# Patient Record
Sex: FEMALE | Race: WHITE
[De-identification: names, ages, dates, MRNs, and addresses within clinical notes are randomized per-mention and may not be internally consistent; named-entity substitution may affect disease eponyms.]

---

## 2017-01-24 ENCOUNTER — HOSPITAL ENCOUNTER (OUTPATIENT)
Dept: HOSPITAL 62 - OD | Age: 52
End: 2017-01-24
Attending: OBSTETRICS & GYNECOLOGY
Payer: COMMERCIAL

## 2017-01-24 DIAGNOSIS — R06.2: Primary | ICD-10-CM

## 2017-01-24 PROCEDURE — 71020: CPT

## 2017-03-02 ENCOUNTER — HOSPITAL ENCOUNTER (OUTPATIENT)
Dept: HOSPITAL 62 - RAD | Age: 52
End: 2017-03-02
Payer: COMMERCIAL

## 2017-03-02 DIAGNOSIS — R07.9: Primary | ICD-10-CM

## 2017-03-02 PROCEDURE — 93017 CV STRESS TEST TRACING ONLY: CPT

## 2017-03-02 PROCEDURE — A9500 TC99M SESTAMIBI: HCPCS

## 2017-03-02 PROCEDURE — 78452 HT MUSCLE IMAGE SPECT MULT: CPT

## 2017-03-02 NOTE — DRAGON STRESS TEST REPORT
INTRAVENOUS LEXISCAN CARDIOLITE STRESS TEST USING SINGLE PHOTON EMMISION 
COMPUTERIZED TOMOGRAPHIC.



DATE OF PROCEDURE: March 2, 2017



INDICATION : Chest pain



CARDIAC RISK FACTORS: Hypertension



RESTING EKG: Sinus rhythm, no Baseline ST-T wave changes noted



STRESS EKG: No significant changes noted with LexiScan bolus 



REASON FOR TERMINATION: Protocol.





PROCEDURE REPORT: Baseline heart rate 69 beats per minute with blood pressure 
of 103/51.  Patient had no significant complaints.  Heart rate at 2 minutes 
post bolus 106 with a blood pressure of 123/62.  3 minutes post bolus heart 
rate 90 with blood pressure of 130/65.  No significant EKG changes were noted.  
Patient had no significant complaints during the procedure or postprocedure.



CONCLUSIONS: Normal EKG and hemodynamic response to IV LexiScan.







NUCLEAR DATA:

At rest the patient was given 10.57 millicuries of technetium 99 sestamibi 
injected intravenously.  As per protocol rest gated SPECT images were obtained.
  Subsequently the patient was given intravenous LexiScan at a dose of 0.4 mg 
in 5 mL intravenously, followed by flush with normal saline.  Subsequently the 
stress dose of 31.8 millicuries of technetium 99 sestamibi was injected 
intravenously.  As per protocol stress gated images were obtained.  



NUCLEAR INTERPRETATION: Both raw and processed data were used for 
interpretation.  Visual, qualitative, computer-generated quantitative data was 
used.  There was good myocardial uptake of technetium compound.  Motion 
artifact and soft tissue attenuations were noted.  Increased visceral uptake 
was noted.  No definitive areas of transient perfusion defect noted.  No 
definitive areas of fixed perfusion defect or scars noted.



EKG gated imaging showed LV EF at 66 %, rest and stress gated EF similar 
visually.  T. I D. computer-generated ratio was 1.45.  Lung heart ratio noted 
to be within normal limits 0.31.  No significant extracardiac and abnormal 
radiotracer activities were noted.  RV free wall uptake was noted to be 
borderline increased.



IMPRESSION: Also refer to comments under nuclear interpretation. Also test 
results needs to be interpreted in the context of pretest probability.





1.  There is no definitive scintigraphic evidence of LexiScan induced 
myocardial ischemia.

2.  There is no definitive scintigraphic evidence of myocardial infarction/scar.

3.  EKG gated imaging shows left ejection fraction of approximately 66 %. 

4.  Transient ischemic dilatation was noted.  This is of uncertain significance 
in the absence of significant perfusion abnormalities.  Most recent literature 
review does not indicate increased cardiovascular event risk with transient 
ischemic dilatation in the absence of significant perfusion abnormalities. 
However sometimes it could be related to LVH and occasionally could be related 
to balanced ischemia.  

5. Clinical correlation requested as occasionally worse disease or balanced 
ischemia could be missed. In approximately 10% of the cases Lexiscan may not 
cause adequate vasodilatory stress.



RECOMMENDATIONS:

Aggressive risk factor modification, medical therapy.

Clinical correlation with echocardiogram derived ejection fraction.

Inability to exercise by itself can lead to increased cardiovascular event 
risks.

Consider cardiology consultation if clinically indicated.











Alexis Polk M.D., ITZEL

Consultant cardiologist,

Board certified in cardiovascular diseases, 

Nuclear cardiology, 

Echocardiography

Cardiac CT and cardiac MRI

Ph. [phone] 

Fax [phone]
[facility]

## 2018-05-14 ENCOUNTER — HOSPITAL ENCOUNTER (OUTPATIENT)
Dept: HOSPITAL 62 - RAD | Age: 53
End: 2018-05-14
Attending: INTERNAL MEDICINE
Payer: COMMERCIAL

## 2018-05-14 DIAGNOSIS — R14.0: Primary | ICD-10-CM

## 2018-05-14 DIAGNOSIS — R10.9: ICD-10-CM

## 2018-05-14 DIAGNOSIS — R10.2: ICD-10-CM

## 2018-05-14 PROCEDURE — 74176 CT ABD & PELVIS W/O CONTRAST: CPT

## 2018-05-15 NOTE — RADIOLOGY REPORT (SQ)
EXAM DESCRIPTION:  CT ABD/PELVIS ORAL ONLY



COMPLETED DATE/TIME:  5/14/2018 4:00 pm



REASON FOR STUDY:  UNSPECIFIED ABDOMINAL PAIN/PELVIC AND PERINEAL PAIN R10.9  UNSPECIFIED ABDOMINAL P
AIN R10.2  PELVIC AND PERINEAL PAIN



COMPARISON:  None.



TECHNIQUE:  CT scan of the abdomen and pelvis performed without intravenous contrast.  Patient is a p
oor historian, and gave a history of IV contrast allergy needing resuscitation

Patient drank oral contrast.

Images reviewed with lung, soft tissue, and bone windows. Reconstructed coronal and sagittal MPR imag
es reviewed. All images stored on PACS.

All CT scanners at this facility use dose modulation, iterative reconstruction, and/or weight based d
osing when appropriate to reduce radiation dose to as low as reasonably achievable (ALARA).

[facility]: Dose Right  [facility]: CareDose    [facility]: Dose Right    [facility]: Teradose 4D    [facility]: Smart Technologies



RADIATION DOSE:  17mGy.



LIMITATIONS:  None.



FINDINGS:  LOWER CHEST: No significant findings. No nodules or infiltrates.

NON-CONTRASTED LIVER, SPLEEN, ADRENALS: Evaluation limited by lack of IV contrast. No identified sign
ificant masses.

PANCREAS: No masses. No peripancreatic inflammatory changes.

GALLBLADDER: No identified stones by CT criteria. No inflammatory changes to suggest cholecystitis.

RIGHT KIDNEY AND URETER: No suspicious masses. Assessment limited by lack of IV contrast.   No signif
icant calcifications.   No hydronephrosis or hydroureter.

LEFT KIDNEY AND URETER: No suspicious masses. Assessment limited by lack of IV contrast.   No signifi
cant calcifications.   No hydronephrosis or hydroureter.

AORTA AND RETROPERITONEUM: No aneurysm. No retroperitoneal masses or adenopathy.

BOWEL AND PERITONEAL CAVITY: Patient drank oral contrast.  Patient has a Alex-en-Y gastric bypass.  N
o delay in emptying of the gastric fundal pouch into the exiting small bowel loop.  No evidence of sm
all bowel obstruction.  Contrast seen throughout the colon with descending and sigmoid colon divertic
donta.  No CT signs of acute diverticulitis.  Rectosigmoid unremarkable.

APPENDIX: Normal.

PELVIS, BLADDER, AND ABDOMINAL WALL:No abnormal masses. No free fluid. Bladder normal.  Normal size u
terus and ovaries.

BONES: Degenerative disc changes at L4-5.  Facet arthropathy at L4-5 and L5-S1.

OTHER: No other significant finding.



IMPRESSION:  NO SIGNIFICANT OR ACUTE PROCESS IN THE ABDOMEN OR PELVIS.



COMMENT:  Quality ID # [id number]: Final reports with documentation of one or more dose reduction techniques
 (e.g., Automated exposure control, adjustment of the mA and/or kV according to patient size, use of 
iterative reconstruction technique)



TECHNICAL DOCUMENTATION:  JOB ID:  [id number]

 2011 [employer]- All Rights Reserved



Reading location - IP/workstation name: [place] [id number]

## 2018-10-11 ENCOUNTER — HOSPITAL ENCOUNTER (EMERGENCY)
Dept: HOSPITAL 62 - ER | Age: 53
LOS: 1 days | Discharge: ANOTHER HOSPITAL | End: 2018-10-12
Payer: COMMERCIAL
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
## 2018-10-11 DIAGNOSIS — Z79.899: ICD-10-CM

## 2018-10-11 DIAGNOSIS — S06.5X0A: Primary | ICD-10-CM

## 2018-10-11 DIAGNOSIS — I10: ICD-10-CM

## 2018-10-11 LAB
ADD MANUAL DIFF: NO
ALBUMIN SERPL-MCNC: 4.4 G/DL (ref 3.5–5)
ALP SERPL-CCNC: 99 U/L (ref 38–126)
ALT SERPL-CCNC: 37 U/L (ref 9–52)
ANION GAP SERPL CALC-SCNC: 17 MMOL/L (ref 5–19)
APTT BLD: 26.5 SEC (ref 23.5–35.8)
AST SERPL-CCNC: 31 U/L (ref 14–36)
BASOPHILS # BLD AUTO: 0 10^3/UL (ref 0–0.2)
BASOPHILS NFR BLD AUTO: 0.5 % (ref 0–2)
BILIRUB DIRECT SERPL-MCNC: 0.2 MG/DL (ref 0–0.4)
BILIRUB SERPL-MCNC: 0.3 MG/DL (ref 0.2–1.3)
BUN SERPL-MCNC: 27 MG/DL (ref 7–20)
CALCIUM: 9.5 MG/DL (ref 8.4–10.2)
CHLORIDE SERPL-SCNC: 101 MMOL/L (ref 98–107)
CO2 SERPL-SCNC: 23 MMOL/L (ref 22–30)
EOSINOPHIL # BLD AUTO: 0.1 10^3/UL (ref 0–0.6)
EOSINOPHIL NFR BLD AUTO: 1.7 % (ref 0–6)
ERYTHROCYTE [DISTWIDTH] IN BLOOD BY AUTOMATED COUNT: 13.6 % (ref 11.5–14)
ETHANOL SERPL-MCNC: 246 MG/DL
GLUCOSE SERPL-MCNC: 90 MG/DL (ref 75–110)
HCT VFR BLD CALC: 39.3 % (ref 36–47)
HGB BLD-MCNC: 13.4 G/DL (ref 12–15.5)
INR PPP: 0.89
LYMPHOCYTES # BLD AUTO: 1.5 10^3/UL (ref 0.5–4.7)
LYMPHOCYTES NFR BLD AUTO: 18.4 % (ref 13–45)
MCH RBC QN AUTO: 33.7 PG (ref 27–33.4)
MCHC RBC AUTO-ENTMCNC: 34.1 G/DL (ref 32–36)
MCV RBC AUTO: 99 FL (ref 80–97)
MONOCYTES # BLD AUTO: 0.6 10^3/UL (ref 0.1–1.4)
MONOCYTES NFR BLD AUTO: 7.5 % (ref 3–13)
NEUTROPHILS # BLD AUTO: 5.7 10^3/UL (ref 1.7–8.2)
NEUTS SEG NFR BLD AUTO: 71.9 % (ref 42–78)
PLATELET # BLD: 168 10^3/UL (ref 150–450)
POTASSIUM SERPL-SCNC: 4.4 MMOL/L (ref 3.6–5)
PROT SERPL-MCNC: 7 G/DL (ref 6.3–8.2)
PROTHROMBIN TIME: 12.5 SEC (ref 11.4–15.4)
RBC # BLD AUTO: 3.97 10^6/UL (ref 3.72–5.28)
SODIUM SERPL-SCNC: 140.7 MMOL/L (ref 137–145)
TOTAL CELLS COUNTED % (AUTO): 100 %
WBC # BLD AUTO: 7.9 10^3/UL (ref 4–10.5)

## 2018-10-11 PROCEDURE — 99285 EMERGENCY DEPT VISIT HI MDM: CPT

## 2018-10-11 PROCEDURE — 72125 CT NECK SPINE W/O DYE: CPT

## 2018-10-11 PROCEDURE — 85610 PROTHROMBIN TIME: CPT

## 2018-10-11 PROCEDURE — 36415 COLL VENOUS BLD VENIPUNCTURE: CPT

## 2018-10-11 PROCEDURE — 85730 THROMBOPLASTIN TIME PARTIAL: CPT

## 2018-10-11 PROCEDURE — 70450 CT HEAD/BRAIN W/O DYE: CPT

## 2018-10-11 PROCEDURE — 80307 DRUG TEST PRSMV CHEM ANLYZR: CPT

## 2018-10-11 PROCEDURE — 85025 COMPLETE CBC W/AUTO DIFF WBC: CPT

## 2018-10-11 PROCEDURE — 80053 COMPREHEN METABOLIC PANEL: CPT

## 2018-10-11 NOTE — RADIOLOGY REPORT (SQ)
EXAM DESCRIPTION: 



XR HAND 1-2 VIEWS



COMPLETED DATE/TME:  10/11/2018 00:00



CLINICAL HISTORY: 



53 years, Female, fall



COMPARISON:

EXAM DESCRIPTION: 







CLINICAL HISTORY: 



fall



COMPARISON: 



None



FINDINGS: 



2 view(s) submitted. No fracture or dislocation is identified.

Bone marrow attenuation is unremarkable. No radiopaque foreign

body is identified.



IMPRESSION: 



No acute fracture or dislocation.





NUMBER OF VIEWS:





TECHNIQUE:





LIMITATIONS:

None.



FINDINGS:







IMPRESSION:





 



 2011 [facility] [employer]- All Rights Reserved

## 2018-10-11 NOTE — RADIOLOGY REPORT (SQ)
EXAM DESCRIPTION: 



XR FOREARM 2 VIEWS



COMPLETED DATE/TME:  10/11/2018 00:00



CLINICAL HISTORY: 



53 years, Female, fall



COMPARISON:

EXAM DESCRIPTION: 







CLINICAL HISTORY: 



fall



COMPARISON: 



None



FINDINGS: 



2 view(s) submitted. No fracture or dislocation is identified.

Bone marrow attenuation is unremarkable. No radiopaque foreign

body is identified.



IMPRESSION: 



No acute fracture or dislocation.





NUMBER OF VIEWS:





TECHNIQUE:





LIMITATIONS:

None.



FINDINGS:







IMPRESSION:





 



 2011 [facility] [employer]- All Rights Reserved

## 2018-10-11 NOTE — RADIOLOGY REPORT (SQ)
PROCEDURE: CT OF THE CERVICAL SPINE WITHOUT INTRAVENOUS CONTRAST



HISTORY: fall, pain, eoth



Indication: Same as above



Comparison: None



Technique: The study was done on 10/11/2018 at 9:52 PM local time



CT of the cervical spine was done without intravenous contrast,

including axial, sagittal and coronal reconstructions.



This exam was performed according to our departmental

dose-optimization program, which includes automated exposure

control, adjustment of the mA and/or KV according to the

patient's size and/or use of iterative reconstruction technique.



FINDINGS: 



There is no CT evidence of acute cervical spinal fractures or

dislocations.



The craniovertebral junction appears unremarkable.



There is also  evidence of moderate amount of  degenerative

change, including osteophyte formation, reduction in the

intervertebral disc spaces, endplate degenerative changes and

facet arthropathy seen at few levels in the cervical spine.



There is limited evaluation for acute or chronic intervertebral

disc herniations or protrusions given the limitation of lack of

intrathecal contrast. 



The prevertebral and the paravertebral soft tissues appear

unremarkable. 



There is no gross evidence of epidural hematoma or paraspinal

soft tissue fluid collections. The remainder of the visualized

surrounding subcutaneous soft tissues and muscle structures are

grossly unremarkable.



The visualized airway appears unremarkable.



The visualized lung apices are unremarkable .



The sagittal reconstructed images demonstrate normal alignment



The coronal reconstructed images demonstrate normal alignment. 



IMPRESSION:



 Negative for acute cervical spine bony trauma.

## 2018-10-11 NOTE — RADIOLOGY REPORT (SQ)
PROCEDURE: CT OF THE HEAD WITHOUT INTRAVENOUS CONTRAST



HISTORY: FALL WITH HEAD INJURY



Indication: Same as above



Comparison: None



Technique: The study was done on 10/11/2018 at 9:51 PM no pontine



CT of the head was done without intravenous contrast was done in

the orthogonal planes.



This exam was performed according to our departmental

dose-optimization program, which includes automated exposure

control, adjustment of the mA and/or KV according to the

patient's size and/or use of iterative reconstruction technique.





FINDINGS: 



There is moderate size right frontal scalp swelling and presence

of tiny 2 mm right frontal brain parenchymal hemorrhagic

contusion. There is also a tiny right frontal acute subdural

hemorrhage, measuring 1 mm across. There is no midline shift or

mass effect. 



There is presence of a 1 cm well-circumscribed calcification in

the left parafalcine location and may represent a small

meningioma



If clinical concern exists regarding an acute ischemic/vascular

pathology being responsible for patient's symptomatology, an MRI

of the brain is more sensitive than the current study, in ruling

out such a possibility.



There is good gray/white matter differentiation.



The ventricular system is normal.



The mastoid air cells are unremarkable . 



The paranasal sinuses are unremarkable .



The findings were discussed with Ms. Dick PA-C at 9:13 PM



IMPRESSION: 



There is moderate size right frontal scalp swelling and presence

of tiny 2 mm right frontal brain parenchymal hemorrhagic

contusion. There is also a tiny right frontal acute subdural

hemorrhage, measuring 1 mm across. There is no midline shift or

mass effect. 



There is presence of a 1 cm well-circumscribed calcification in

the left parafalcine location and may represent a small

meningioma

## 2018-10-12 VITALS — DIASTOLIC BLOOD PRESSURE: 64 MMHG | SYSTOLIC BLOOD PRESSURE: 101 MMHG
